# Patient Record
Sex: MALE | Race: WHITE | NOT HISPANIC OR LATINO | URBAN - METROPOLITAN AREA
[De-identification: names, ages, dates, MRNs, and addresses within clinical notes are randomized per-mention and may not be internally consistent; named-entity substitution may affect disease eponyms.]

---

## 2018-10-02 ENCOUNTER — OUTPATIENT (OUTPATIENT)
Dept: OUTPATIENT SERVICES | Facility: HOSPITAL | Age: 83
LOS: 1 days | End: 2018-10-02
Payer: MEDICARE

## 2018-10-02 VITALS
TEMPERATURE: 98 F | HEART RATE: 78 BPM | WEIGHT: 162.04 LBS | HEIGHT: 68 IN | SYSTOLIC BLOOD PRESSURE: 140 MMHG | DIASTOLIC BLOOD PRESSURE: 88 MMHG | RESPIRATION RATE: 18 BRPM

## 2018-10-02 DIAGNOSIS — Z98.49 CATARACT EXTRACTION STATUS, UNSPECIFIED EYE: Chronic | ICD-10-CM

## 2018-10-02 DIAGNOSIS — G56.01 CARPAL TUNNEL SYNDROME, RIGHT UPPER LIMB: ICD-10-CM

## 2018-10-02 DIAGNOSIS — Z98.890 OTHER SPECIFIED POSTPROCEDURAL STATES: Chronic | ICD-10-CM

## 2018-10-02 DIAGNOSIS — Z87.898 PERSONAL HISTORY OF OTHER SPECIFIED CONDITIONS: Chronic | ICD-10-CM

## 2018-10-02 LAB
BUN SERPL-MCNC: 20 MG/DL — SIGNIFICANT CHANGE UP (ref 7–23)
CALCIUM SERPL-MCNC: 8.9 MG/DL — SIGNIFICANT CHANGE UP (ref 8.4–10.5)
CHLORIDE SERPL-SCNC: 90 MMOL/L — LOW (ref 98–107)
CO2 SERPL-SCNC: 30 MMOL/L — SIGNIFICANT CHANGE UP (ref 22–31)
CREAT SERPL-MCNC: 1 MG/DL — SIGNIFICANT CHANGE UP (ref 0.5–1.3)
GLUCOSE SERPL-MCNC: 93 MG/DL — SIGNIFICANT CHANGE UP (ref 70–99)
HCT VFR BLD CALC: 43 % — SIGNIFICANT CHANGE UP (ref 39–50)
HGB BLD-MCNC: 14.5 G/DL — SIGNIFICANT CHANGE UP (ref 13–17)
MCHC RBC-ENTMCNC: 31 PG — SIGNIFICANT CHANGE UP (ref 27–34)
MCHC RBC-ENTMCNC: 33.7 % — SIGNIFICANT CHANGE UP (ref 32–36)
MCV RBC AUTO: 92.1 FL — SIGNIFICANT CHANGE UP (ref 80–100)
NRBC # FLD: 0 — SIGNIFICANT CHANGE UP
PLATELET # BLD AUTO: 188 K/UL — SIGNIFICANT CHANGE UP (ref 150–400)
PMV BLD: 9.8 FL — SIGNIFICANT CHANGE UP (ref 7–13)
POTASSIUM SERPL-MCNC: 3.8 MMOL/L — SIGNIFICANT CHANGE UP (ref 3.5–5.3)
POTASSIUM SERPL-SCNC: 3.8 MMOL/L — SIGNIFICANT CHANGE UP (ref 3.5–5.3)
RBC # BLD: 4.67 M/UL — SIGNIFICANT CHANGE UP (ref 4.2–5.8)
RBC # FLD: 12.9 % — SIGNIFICANT CHANGE UP (ref 10.3–14.5)
SODIUM SERPL-SCNC: 134 MMOL/L — LOW (ref 135–145)
WBC # BLD: 7.87 K/UL — SIGNIFICANT CHANGE UP (ref 3.8–10.5)
WBC # FLD AUTO: 7.87 K/UL — SIGNIFICANT CHANGE UP (ref 3.8–10.5)

## 2018-10-02 PROCEDURE — 93010 ELECTROCARDIOGRAM REPORT: CPT

## 2018-10-02 RX ORDER — OMEGA-3 ACID ETHYL ESTERS 1 G
2 CAPSULE ORAL
Qty: 0 | Refills: 0 | COMMUNITY

## 2018-10-02 RX ORDER — FINASTERIDE 5 MG/1
1 TABLET, FILM COATED ORAL
Qty: 0 | Refills: 0 | COMMUNITY

## 2018-10-02 RX ORDER — LACTOBACILLUS ACIDOPHILUS 100MM CELL
1 CAPSULE ORAL
Qty: 0 | Refills: 0 | COMMUNITY

## 2018-10-02 RX ORDER — TAMSULOSIN HYDROCHLORIDE 0.4 MG/1
1 CAPSULE ORAL
Qty: 0 | Refills: 0 | COMMUNITY

## 2018-10-02 RX ORDER — ASPIRIN/CALCIUM CARB/MAGNESIUM 324 MG
1 TABLET ORAL
Qty: 0 | Refills: 0 | COMMUNITY

## 2018-10-02 NOTE — H&P PST ADULT - PSH
History of tumor  right chest excised-1950's  S/P ECCE (extracapsular cataract extraction)  MADAY-2018  S/P Mohs surgery for basal cell carcinoma  ear-2016,, nose-2017

## 2018-10-02 NOTE — H&P PST ADULT - PMH
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

## 2018-10-02 NOTE — H&P PST ADULT - HISTORY OF PRESENT ILLNESS
Pt. is an 86 yo male with MADAY carpal tunnel.  The right is more severe.  Pt. has had 2 steroid injections.

## 2018-10-04 ENCOUNTER — TRANSCRIPTION ENCOUNTER (OUTPATIENT)
Age: 83
End: 2018-10-04

## 2018-10-05 ENCOUNTER — OUTPATIENT (OUTPATIENT)
Dept: OUTPATIENT SERVICES | Facility: HOSPITAL | Age: 83
LOS: 1 days | Discharge: HOME | End: 2018-10-05

## 2018-10-05 VITALS
HEART RATE: 61 BPM | OXYGEN SATURATION: 98 % | DIASTOLIC BLOOD PRESSURE: 77 MMHG | SYSTOLIC BLOOD PRESSURE: 144 MMHG | RESPIRATION RATE: 20 BRPM

## 2018-10-05 VITALS
TEMPERATURE: 97 F | OXYGEN SATURATION: 97 % | HEIGHT: 68 IN | HEART RATE: 83 BPM | WEIGHT: 162.04 LBS | SYSTOLIC BLOOD PRESSURE: 151 MMHG | RESPIRATION RATE: 16 BRPM | DIASTOLIC BLOOD PRESSURE: 72 MMHG

## 2018-10-05 DIAGNOSIS — Z87.898 PERSONAL HISTORY OF OTHER SPECIFIED CONDITIONS: Chronic | ICD-10-CM

## 2018-10-05 DIAGNOSIS — Z98.890 OTHER SPECIFIED POSTPROCEDURAL STATES: Chronic | ICD-10-CM

## 2018-10-05 DIAGNOSIS — Z98.49 CATARACT EXTRACTION STATUS, UNSPECIFIED EYE: Chronic | ICD-10-CM

## 2018-10-05 DIAGNOSIS — G56.01 CARPAL TUNNEL SYNDROME, RIGHT UPPER LIMB: ICD-10-CM

## 2018-10-05 NOTE — ASU DISCHARGE PLAN (ADULT/PEDIATRIC). - NOTIFY
Swelling that continues/Unable to Urinate/Bleeding that does not stop/per instruction sheet Unable to Urinate/per instruction sheet/Fever greater than 101/Persistent Nausea and Vomiting/Swelling that continues/Bleeding that does not stop

## 2018-11-19 NOTE — H&P PST ADULT - VISION (WITH CORRECTIVE LENSES IF THE PATIENT USUALLY WEARS THEM):
Sylvia with [facility] called to follow up on the referral request that she called about on the 14th.     Her number is [phone] ex [phone] wears eyeglasses

## 2019-10-08 NOTE — ASU PREOPERATIVE ASSESSMENT, ADULT ([facility] ONLY) - BP NONINVASIVE DIASTOLIC (MM HG)
Protocol failed due to Last HgBA1C < 7.5    Please advise,    LOV:7/20/19 AD  FOV:11/2/19   LAST RX:4/4/19 25 mg take 1 tab daily 90 tabs 1 refill   LAST LABS:7/18/19 a1c,iron and tibc,ferritin,tsh,lipids,cmp,cbc  PER PROTOCOL: to provider 72

## 2022-11-07 ENCOUNTER — OFFICE (OUTPATIENT)
Dept: URBAN - METROPOLITAN AREA CLINIC 90 | Facility: CLINIC | Age: 87
Setting detail: OPHTHALMOLOGY
End: 2022-11-07
Payer: MEDICARE

## 2022-11-07 DIAGNOSIS — H21.29: ICD-10-CM

## 2022-11-07 DIAGNOSIS — H43.391: ICD-10-CM

## 2022-11-07 PROCEDURE — 92014 COMPRE OPH EXAM EST PT 1/>: CPT | Performed by: OPHTHALMOLOGY

## 2022-11-07 ASSESSMENT — SPHEQUIV_DERIVED
OS_SPHEQUIV: -0.5
OD_SPHEQUIV: -0.875
OS_SPHEQUIV: -0.5
OD_SPHEQUIV: -0.25

## 2022-11-07 ASSESSMENT — TONOMETRY
OS_IOP_MMHG: 16
OD_IOP_MMHG: 17

## 2022-11-07 ASSESSMENT — REFRACTION_CURRENTRX
OS_SPHERE: +1.00
OS_CYLINDER: -2.50
OS_AXIS: 091
OS_OVR_VA: 20/
OD_AXIS: 093
OD_SPHERE: +1.00
OD_ADD: +2.75
OS_AXIS: 087
OS_VPRISM_DIRECTION: BF
OD_SPHERE: +1.25
OS_ADD: +2.75
OS_ADD: +2.50
OS_SPHERE: +0.75
OD_SPHERE: +1.00
OS_OVR_VA: 20/
OS_AXIS: 086
OD_VPRISM_DIRECTION: BF
OD_OVR_VA: 20/
OD_CYLINDER: -3.00
OD_OVR_VA: 20/
OS_VPRISM_DIRECTION: BF
OD_AXIS: 090
OS_CYLINDER: -2.75
OD_CYLINDER: -2.75
OD_AXIS: 088
OD_ADD: +2.50
OS_OVR_VA: 20/
OD_ADD: +2.75
OS_SPHERE: +0.75
OS_ADD: +2.75
OD_VPRISM_DIRECTION: BF
OS_CYLINDER: -2.75
OD_OVR_VA: 20/
OD_CYLINDER: -2.75

## 2022-11-07 ASSESSMENT — KERATOMETRY
OD_K2POWER_DIOPTERS: 44.25
OS_AXISANGLE_DEGREES: 180
OD_AXISANGLE_DEGREES: 013
OS_K1POWER_DIOPTERS: 41.75
OD_K1POWER_DIOPTERS: 41.25
METHOD_AUTO_MANUAL: AUTO
OS_K2POWER_DIOPTERS: 44.25

## 2022-11-07 ASSESSMENT — AXIALLENGTH_DERIVED
OD_AL: 24.2248
OS_AL: 23.9765
OD_AL: 23.9708
OS_AL: 23.9765

## 2022-11-07 ASSESSMENT — REFRACTION_MANIFEST
OD_AXIS: 088
OD_ADD: +2.75
OD_CYLINDER: -3.00
OS_SPHERE: +0.75
OD_SPHERE: +1.25
OS_CYLINDER: -2.50
OS_AXIS: 086
OS_ADD: +2.75

## 2022-11-07 ASSESSMENT — CONFRONTATIONAL VISUAL FIELD TEST (CVF)
OS_FINDINGS: FULL
OD_FINDINGS: FULL

## 2022-11-07 ASSESSMENT — REFRACTION_AUTOREFRACTION
OD_CYLINDER: -3.25
OS_SPHERE: +1.25
OD_AXIS: 098
OD_SPHERE: +0.75
OS_CYLINDER: -3.50
OS_AXIS: 087

## 2022-11-07 ASSESSMENT — VISUAL ACUITY
OD_BCVA: 20/20-1
OS_BCVA: 20/20-2

## 2023-10-31 PROBLEM — G56.03 CARPAL TUNNEL SYNDROME, BILATERAL UPPER LIMBS: Chronic | Status: ACTIVE | Noted: 2018-10-02

## 2023-10-31 PROBLEM — I10 ESSENTIAL (PRIMARY) HYPERTENSION: Chronic | Status: ACTIVE | Noted: 2018-10-02

## 2023-10-31 PROBLEM — N40.0 BENIGN PROSTATIC HYPERPLASIA WITHOUT LOWER URINARY TRACT SYMPTOMS: Chronic | Status: ACTIVE | Noted: 2018-10-02

## 2023-10-31 PROBLEM — Z86.69 PERSONAL HISTORY OF OTHER DISEASES OF THE NERVOUS SYSTEM AND SENSE ORGANS: Chronic | Status: ACTIVE | Noted: 2018-10-02

## 2023-11-08 ENCOUNTER — APPOINTMENT (OUTPATIENT)
Dept: GASTROENTEROLOGY | Facility: CLINIC | Age: 88
End: 2023-11-08
Payer: MEDICARE

## 2023-11-08 VITALS
WEIGHT: 156 LBS | HEART RATE: 77 BPM | BODY MASS INDEX: 23.11 KG/M2 | SYSTOLIC BLOOD PRESSURE: 124 MMHG | HEIGHT: 69 IN | OXYGEN SATURATION: 97 % | TEMPERATURE: 98 F | DIASTOLIC BLOOD PRESSURE: 74 MMHG | RESPIRATION RATE: 16 BRPM

## 2023-11-08 PROBLEM — Z00.00 ENCOUNTER FOR PREVENTIVE HEALTH EXAMINATION: Status: ACTIVE | Noted: 2023-11-08

## 2023-11-08 PROCEDURE — 99203 OFFICE O/P NEW LOW 30 MIN: CPT

## 2023-11-08 RX ORDER — TAMSULOSIN HYDROCHLORIDE 0.4 MG/1
0.4 CAPSULE ORAL
Refills: 0 | Status: ACTIVE | COMMUNITY

## 2023-11-08 RX ORDER — FINASTERIDE 5 MG/1
5 TABLET, FILM COATED ORAL
Refills: 0 | Status: ACTIVE | COMMUNITY

## 2023-11-08 RX ORDER — LOSARTAN POTASSIUM AND HYDROCHLOROTHIAZIDE 25; 100 MG/1; MG/1
100-25 TABLET ORAL
Refills: 0 | Status: ACTIVE | COMMUNITY

## 2024-01-05 ENCOUNTER — APPOINTMENT (OUTPATIENT)
Dept: GASTROENTEROLOGY | Facility: CLINIC | Age: 89
End: 2024-01-05
Payer: MEDICARE

## 2024-01-05 VITALS
HEART RATE: 92 BPM | BODY MASS INDEX: 23.55 KG/M2 | WEIGHT: 159 LBS | OXYGEN SATURATION: 95 % | RESPIRATION RATE: 16 BRPM | HEIGHT: 69 IN | DIASTOLIC BLOOD PRESSURE: 78 MMHG | SYSTOLIC BLOOD PRESSURE: 120 MMHG | TEMPERATURE: 97.8 F

## 2024-01-05 DIAGNOSIS — R35.1 BENIGN PROSTATIC HYPERPLASIA WITH LOWER URINARY TRACT SYMPMS: ICD-10-CM

## 2024-01-05 DIAGNOSIS — I10 ESSENTIAL (PRIMARY) HYPERTENSION: ICD-10-CM

## 2024-01-05 DIAGNOSIS — R19.8 OTHER SPECIFIED SYMPTOMS AND SIGNS INVOLVING THE DIGESTIVE SYSTEM AND ABDOMEN: ICD-10-CM

## 2024-01-05 DIAGNOSIS — N40.1 BENIGN PROSTATIC HYPERPLASIA WITH LOWER URINARY TRACT SYMPMS: ICD-10-CM

## 2024-01-05 PROCEDURE — 99213 OFFICE O/P EST LOW 20 MIN: CPT

## 2024-01-05 NOTE — HISTORY OF PRESENT ILLNESS
[FreeTextEntry1] : 89 yo male, with frequent bms up to 4 x day. No blood or mucus. No weight loss or anorexia. Hx of htn, bph. No weight loss or anorexia. No nocturnal sxs. Used to go the gym but stopped recently.  Stool calprotectin, gpp neg.hpylori neg.  RTO today, states bms improved. Gained 3 lbs..

## 2024-01-05 NOTE — ASSESSMENT
[FreeTextEntry1] : 89 yo male, with frequent bms up to 4 x day. No blood or mucus. No weight loss or anorexia. Hx of htn, bph. No weight loss or anorexia. No nocturnal sxs. Used to go the gym but stopped recently.  Stool calprotectin, gpp neg.hpylori neg.  RTO today, states bms improved. Gained 3 lbs..  IMP: 1. frequent bowel movement-improved 2.resolved incontinence  PLAN: 1maintain citrucel 2. prn followup

## 2024-02-20 DIAGNOSIS — R14.0 ABDOMINAL DISTENSION (GASEOUS): ICD-10-CM

## 2024-02-20 RX ORDER — FAMOTIDINE 40 MG/1
40 TABLET, FILM COATED ORAL
Qty: 90 | Refills: 2 | Status: ACTIVE | COMMUNITY
Start: 2024-02-20 | End: 1900-01-01

## 2024-05-10 ENCOUNTER — OFFICE (OUTPATIENT)
Dept: URBAN - METROPOLITAN AREA CLINIC 90 | Facility: CLINIC | Age: 89
Setting detail: OPHTHALMOLOGY
End: 2024-05-10
Payer: MEDICARE

## 2024-05-10 DIAGNOSIS — H21.29: ICD-10-CM

## 2024-05-10 DIAGNOSIS — H43.391: ICD-10-CM

## 2024-05-10 DIAGNOSIS — H43.813: ICD-10-CM

## 2024-05-10 DIAGNOSIS — D31.31: ICD-10-CM

## 2024-05-10 PROCEDURE — 92014 COMPRE OPH EXAM EST PT 1/>: CPT | Performed by: OPHTHALMOLOGY

## 2024-05-10 ASSESSMENT — CONFRONTATIONAL VISUAL FIELD TEST (CVF)
OS_FINDINGS: FULL
OD_FINDINGS: FULL

## 2024-07-18 ENCOUNTER — NON-APPOINTMENT (OUTPATIENT)
Age: 89
End: 2024-07-18

## 2024-10-04 RX ORDER — OMEPRAZOLE 40 MG/1
40 CAPSULE, DELAYED RELEASE ORAL
Qty: 90 | Refills: 3 | Status: ACTIVE | COMMUNITY
Start: 2024-10-04 | End: 1900-01-01

## 2024-11-14 ENCOUNTER — OFFICE (OUTPATIENT)
Age: 89
Setting detail: OPHTHALMOLOGY
End: 2024-11-14
Payer: MEDICARE

## 2024-11-14 DIAGNOSIS — H01.002: ICD-10-CM

## 2024-11-14 DIAGNOSIS — H21.29: ICD-10-CM

## 2024-11-14 DIAGNOSIS — H43.813: ICD-10-CM

## 2024-11-14 DIAGNOSIS — D31.31: ICD-10-CM

## 2024-11-14 DIAGNOSIS — H16.223: ICD-10-CM

## 2024-11-14 DIAGNOSIS — H43.391: ICD-10-CM

## 2024-11-14 DIAGNOSIS — H01.005: ICD-10-CM

## 2024-11-14 PROCEDURE — 92014 COMPRE OPH EXAM EST PT 1/>: CPT | Performed by: OPHTHALMOLOGY

## 2024-11-14 ASSESSMENT — REFRACTION_AUTOREFRACTION
OD_AXIS: 096
OS_SPHERE: +1.75
OS_AXIS: 085
OD_CYLINDER: -3.25
OD_SPHERE: +1.00
OS_CYLINDER: -4.00

## 2024-11-14 ASSESSMENT — REFRACTION_CURRENTRX
OD_ADD: +2.75
OD_ADD: +2.75
OS_ADD: +2.75
OD_AXIS: 090
OD_AXIS: 093
OD_CYLINDER: -2.75
OD_VPRISM_DIRECTION: BF
OD_CYLINDER: -3.00
OS_OVR_VA: 20/
OS_ADD: +2.50
OS_CYLINDER: -2.50
OD_SPHERE: +1.00
OS_OVR_VA: 20/
OD_AXIS: 088
OS_VPRISM_DIRECTION: BF
OD_VPRISM_DIRECTION: BF
OS_SPHERE: +0.75
OD_OVR_VA: 20/
OS_AXIS: 091
OS_OVR_VA: 20/
OS_AXIS: 086
OS_SPHERE: +1.00
OS_ADD: +2.75
OD_SPHERE: +1.00
OS_CYLINDER: -2.75
OD_OVR_VA: 20/
OD_CYLINDER: -2.75
OS_SPHERE: +0.75
OD_ADD: +2.50
OS_VPRISM_DIRECTION: BF
OS_AXIS: 087
OD_OVR_VA: 20/
OD_SPHERE: +1.25
OS_CYLINDER: -2.75

## 2024-11-14 ASSESSMENT — REFRACTION_MANIFEST
OS_AXIS: 083
OD_ADD: +2.75
OS_CYLINDER: -2.50
OD_ADD: +2.75
OS_ADD: +2.75
OD_SPHERE: +1.25
OS_ADD: +2.75
OS_AXIS: 083
OD_CYLINDER: -3.00
OD_CYLINDER: -3.00
OS_SPHERE: +0.75
OS_CYLINDER: -2.50
OD_AXIS: 090
OS_SPHERE: +0.75
OD_AXIS: 090
OD_SPHERE: +1.25

## 2024-11-14 ASSESSMENT — LID EXAM ASSESSMENTS
OD_BLEPHARITIS: RLL 2+
OS_BLEPHARITIS: LLL 2+

## 2024-11-14 ASSESSMENT — KERATOMETRY
OS_K1POWER_DIOPTERS: 40.75
METHOD_AUTO_MANUAL: AUTO
OS_K2POWER_DIOPTERS: 44.00
OD_AXISANGLE_DEGREES: 005
OD_K1POWER_DIOPTERS: 41.50
OS_AXISANGLE_DEGREES: 173
OD_K2POWER_DIOPTERS: 44.25

## 2024-11-14 ASSESSMENT — TEAR BREAK UP TIME (TBUT)
OD_TBUT: 2+
OS_TBUT: 2+

## 2024-11-14 ASSESSMENT — VISUAL ACUITY
OS_BCVA: 20/40+1
OD_BCVA: 20/20

## 2024-11-14 ASSESSMENT — CONFRONTATIONAL VISUAL FIELD TEST (CVF)
OS_FINDINGS: FULL
OD_FINDINGS: FULL

## 2024-11-14 ASSESSMENT — TONOMETRY
OS_IOP_MMHG: 18
OD_IOP_MMHG: 17

## 2024-11-21 ENCOUNTER — APPOINTMENT (OUTPATIENT)
Dept: GASTROENTEROLOGY | Facility: CLINIC | Age: 89
End: 2024-11-21
Payer: MEDICARE

## 2024-11-21 VITALS
SYSTOLIC BLOOD PRESSURE: 120 MMHG | DIASTOLIC BLOOD PRESSURE: 64 MMHG | HEART RATE: 78 BPM | WEIGHT: 155 LBS | RESPIRATION RATE: 16 BRPM | BODY MASS INDEX: 22.96 KG/M2 | OXYGEN SATURATION: 98 % | HEIGHT: 69 IN

## 2024-11-21 DIAGNOSIS — R14.0 ABDOMINAL DISTENSION (GASEOUS): ICD-10-CM

## 2024-11-21 DIAGNOSIS — I10 ESSENTIAL (PRIMARY) HYPERTENSION: ICD-10-CM

## 2024-11-21 DIAGNOSIS — R19.8 OTHER SPECIFIED SYMPTOMS AND SIGNS INVOLVING THE DIGESTIVE SYSTEM AND ABDOMEN: ICD-10-CM

## 2024-11-21 PROCEDURE — 99213 OFFICE O/P EST LOW 20 MIN: CPT

## 2024-11-21 RX ORDER — SERTRALINE HYDROCHLORIDE 50 MG/1
50 TABLET, FILM COATED ORAL
Refills: 0 | Status: ACTIVE | COMMUNITY

## 2024-11-21 RX ORDER — APIXABAN 5 MG/1
TABLET, FILM COATED ORAL
Refills: 0 | Status: ACTIVE | COMMUNITY

## 2024-11-21 RX ORDER — TAMSULOSIN HYDROCHLORIDE 0.4 MG/1
0.4 CAPSULE ORAL
Refills: 0 | Status: ACTIVE | COMMUNITY

## 2024-11-21 RX ORDER — FINASTERIDE 5 MG/1
5 TABLET, FILM COATED ORAL
Refills: 0 | Status: ACTIVE | COMMUNITY

## 2024-11-21 RX ORDER — POTASSIUM CHLORIDE 20 MEQ
20 TABLET, EXT RELEASE, PARTICLES/CRYSTALS ORAL
Refills: 0 | Status: ACTIVE | COMMUNITY

## 2024-11-21 RX ORDER — FUROSEMIDE 40 MG/1
40 TABLET ORAL
Refills: 0 | Status: ACTIVE | COMMUNITY

## 2024-11-21 RX ORDER — LOSARTAN POTASSIUM AND HYDROCHLOROTHIAZIDE 50; 12.5 MG/1; MG/1
50-12.5 TABLET, FILM COATED ORAL
Refills: 0 | Status: ACTIVE | COMMUNITY

## 2025-07-11 ENCOUNTER — RX ONLY (RX ONLY)
Age: OVER 89
End: 2025-07-11

## 2025-07-11 ENCOUNTER — OFFICE (OUTPATIENT)
Facility: LOCATION | Age: OVER 89
Setting detail: OPHTHALMOLOGY
End: 2025-07-11
Payer: MEDICARE

## 2025-07-11 DIAGNOSIS — H43.391: ICD-10-CM

## 2025-07-11 DIAGNOSIS — H21.29: ICD-10-CM

## 2025-07-11 DIAGNOSIS — H01.005: ICD-10-CM

## 2025-07-11 DIAGNOSIS — H01.002: ICD-10-CM

## 2025-07-11 DIAGNOSIS — H00.015: ICD-10-CM

## 2025-07-11 DIAGNOSIS — H16.223: ICD-10-CM

## 2025-07-11 DIAGNOSIS — H43.813: ICD-10-CM

## 2025-07-11 PROCEDURE — 99213 OFFICE O/P EST LOW 20 MIN: CPT | Performed by: STUDENT IN AN ORGANIZED HEALTH CARE EDUCATION/TRAINING PROGRAM

## 2025-07-11 ASSESSMENT — REFRACTION_MANIFEST
OS_ADD: +2.75
OS_CYLINDER: -2.50
OD_AXIS: 090
OS_CYLINDER: -2.50
OS_SPHERE: +0.75
OD_ADD: +2.75
OD_SPHERE: +1.25
OS_SPHERE: +0.75
OD_AXIS: 090
OS_AXIS: 083
OS_ADD: +2.75
OD_SPHERE: +1.25
OD_ADD: +2.75
OS_AXIS: 083
OD_CYLINDER: -3.00
OD_CYLINDER: -3.00

## 2025-07-11 ASSESSMENT — REFRACTION_CURRENTRX
OS_SPHERE: +0.75
OD_CYLINDER: -2.75
OS_CYLINDER: -2.50
OD_SPHERE: +1.00
OS_CYLINDER: -2.75
OD_CYLINDER: -2.75
OD_VPRISM_DIRECTION: BF
OS_SPHERE: +1.00
OD_AXIS: 088
OS_AXIS: 086
OD_AXIS: 093
OS_ADD: +2.75
OD_ADD: +2.50
OS_VPRISM_DIRECTION: BF
OS_AXIS: 087
OS_SPHERE: +0.75
OD_OVR_VA: 20/
OD_CYLINDER: -3.00
OS_SPHERE: +0.75
OS_AXIS: 086
OD_VPRISM_DIRECTION: BF
OS_AXIS: 091
OD_SPHERE: +1.25
OS_ADD: +2.50
OD_AXIS: 090
OS_CYLINDER: -2.75
OS_OVR_VA: 20/
OD_CYLINDER: -3.00
OS_CYLINDER: -2.50
OS_OVR_VA: 20/
OD_OVR_VA: 20/
OD_ADD: +2.75
OD_ADD: +2.75
OD_SPHERE: +1.25
OD_OVR_VA: 20/
OS_VPRISM_DIRECTION: BF
OS_ADD: +2.75
OS_OVR_VA: 20/
OD_SPHERE: +1.00
OD_AXIS: 086

## 2025-07-11 ASSESSMENT — KERATOMETRY
OS_K2POWER_DIOPTERS: 44.25
OS_K1POWER_DIOPTERS: 41.00
METHOD_AUTO_MANUAL: AUTO
OD_AXISANGLE_DEGREES: 007
OD_K1POWER_DIOPTERS: 41.25
OD_K2POWER_DIOPTERS: 44.25
OS_AXISANGLE_DEGREES: 179

## 2025-07-11 ASSESSMENT — REFRACTION_AUTOREFRACTION
OD_CYLINDER: -3.75
OS_CYLINDER: -3.75
OS_SPHERE: +1.50
OD_AXIS: 097
OS_AXIS: 088
OD_SPHERE: +1.25

## 2025-07-11 ASSESSMENT — TEAR BREAK UP TIME (TBUT)
OS_TBUT: 2+
OD_TBUT: 2+

## 2025-07-11 ASSESSMENT — SUPERFICIAL PUNCTATE KERATITIS (SPK)
OD_SPK: 1+
OS_SPK: 1+

## 2025-07-11 ASSESSMENT — TONOMETRY
OS_IOP_MMHG: 16
OD_IOP_MMHG: 15

## 2025-07-11 ASSESSMENT — CONFRONTATIONAL VISUAL FIELD TEST (CVF)
OS_FINDINGS: FULL
OD_FINDINGS: FULL

## 2025-07-11 ASSESSMENT — VISUAL ACUITY
OS_BCVA: 20/25-2
OD_BCVA: 20/20

## 2025-07-11 ASSESSMENT — LID EXAM ASSESSMENTS
OS_BLEPHARITIS: LLL 2+
OD_BLEPHARITIS: RLL 2+